# Patient Record
Sex: FEMALE | Race: OTHER
[De-identification: names, ages, dates, MRNs, and addresses within clinical notes are randomized per-mention and may not be internally consistent; named-entity substitution may affect disease eponyms.]

---

## 2017-09-26 ENCOUNTER — HOSPITAL ENCOUNTER (EMERGENCY)
Dept: HOSPITAL 54 - ER | Age: 72
Discharge: HOME | End: 2017-09-26
Payer: MEDICARE

## 2017-09-26 VITALS — BODY MASS INDEX: 32.39 KG/M2 | WEIGHT: 176 LBS | HEIGHT: 62 IN

## 2017-09-26 VITALS — DIASTOLIC BLOOD PRESSURE: 74 MMHG | SYSTOLIC BLOOD PRESSURE: 132 MMHG
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
## 2017-09-26 DIAGNOSIS — S82.851A: Primary | ICD-10-CM

## 2017-09-26 DIAGNOSIS — Z95.5: ICD-10-CM
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
## 2017-09-26 DIAGNOSIS — Z95.1: ICD-10-CM

## 2017-09-26 DIAGNOSIS — M85.80: ICD-10-CM

## 2017-09-26 PROCEDURE — A4606 OXYGEN PROBE USED W OXIMETER: HCPCS

## 2017-09-26 PROCEDURE — Z7610: HCPCS

## 2017-09-26 NOTE — NUR
PT CAME IN WITH C/O "TRIPPED OVER THE FLOOR LAST NIGHT AND FELL AT HOME"; 
DENIES PRIOR SX'S. SEEN BY MD FOR EVAL. VSS. SAFETY AND COMFORT MEASURES 
PROVIDED. WILL MONITOR.

## 2017-09-26 NOTE — NUR
EMILY ALEJANDRA AT  FOR CRUTCHES TRAINING AND SPLINT APPLICATION. TEACHINGS PROVIDED. 
ALL QUESTIONS ANSWERED.

## 2017-10-02 ENCOUNTER — HOSPITAL ENCOUNTER (OUTPATIENT)
Dept: HOSPITAL 54 - RAD | Age: 72
Discharge: HOME | End: 2017-10-02
Attending: PODIATRIST
Payer: MEDICARE

## 2017-10-02 DIAGNOSIS — S82.841A: Primary | ICD-10-CM
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
## 2017-10-02 DIAGNOSIS — M25.471: ICD-10-CM

## 2017-10-02 DIAGNOSIS — M77.31: ICD-10-CM
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
## 2017-10-03 ENCOUNTER — HOSPITAL ENCOUNTER (OUTPATIENT)
Dept: HOSPITAL 54 - WOU | Age: 72
Discharge: HOME HEALTH | End: 2017-10-03
Attending: PODIATRIST
Payer: MEDICARE

## 2017-10-03 DIAGNOSIS — Z79.02: ICD-10-CM

## 2017-10-03 DIAGNOSIS — Z74.01: ICD-10-CM
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
## 2017-10-03 DIAGNOSIS — R60.0: ICD-10-CM

## 2017-10-03 DIAGNOSIS — S82.841A: Primary | ICD-10-CM

## 2017-10-03 DIAGNOSIS — Z79.82: ICD-10-CM

## 2017-10-03 DIAGNOSIS — I51.9: ICD-10-CM

## 2017-10-03 DIAGNOSIS — Z95.5: ICD-10-CM
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
## 2017-10-31 ENCOUNTER — HOSPITAL ENCOUNTER (OUTPATIENT)
Dept: HOSPITAL 54 - WOU | Age: 72
Discharge: HOME HEALTH | End: 2017-10-31
Attending: PODIATRIST
Payer: MEDICARE

## 2017-10-31 DIAGNOSIS — M25.571: ICD-10-CM
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
## 2017-10-31 DIAGNOSIS — S82.841D: Primary | ICD-10-CM

## 2017-10-31 DIAGNOSIS — Z79.01: ICD-10-CM

## 2017-10-31 DIAGNOSIS — Z79.02: ICD-10-CM

## 2017-10-31 DIAGNOSIS — R60.0: ICD-10-CM

## 2017-11-28 ENCOUNTER — HOSPITAL ENCOUNTER (OUTPATIENT)
Dept: HOSPITAL 54 - RAD | Age: 72
Discharge: HOME | End: 2017-11-28
Attending: PODIATRIST
Payer: MEDICARE

## 2017-11-28 DIAGNOSIS — M25.471: Primary | ICD-10-CM

## 2017-12-01 ENCOUNTER — HOSPITAL ENCOUNTER (OUTPATIENT)
Dept: HOSPITAL 54 - WOU | Age: 72
Discharge: HOME HEALTH | End: 2017-12-01
Attending: PODIATRIST
Payer: MEDICARE

## 2017-12-01 DIAGNOSIS — R60.0: ICD-10-CM
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
## 2017-12-01 DIAGNOSIS — S82.841D: Primary | ICD-10-CM

## 2017-12-01 PROCEDURE — G0463 HOSPITAL OUTPT CLINIC VISIT: HCPCS

## 2017-12-28 ENCOUNTER — HOSPITAL ENCOUNTER (OUTPATIENT)
Dept: HOSPITAL 54 - RAD | Age: 72
Discharge: HOME | End: 2017-12-28
Attending: PODIATRIST
Payer: MEDICARE

## 2017-12-28 DIAGNOSIS — M77.31: ICD-10-CM

## 2017-12-28 DIAGNOSIS — M79.89: ICD-10-CM

## 2017-12-28 DIAGNOSIS — S82.51XD: Primary | ICD-10-CM
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
## 2017-12-28 DIAGNOSIS — M81.0: ICD-10-CM

## 2018-01-02 ENCOUNTER — HOSPITAL ENCOUNTER (OUTPATIENT)
Dept: HOSPITAL 54 - WOU | Age: 73
Discharge: HOME | End: 2018-01-02
Attending: PODIATRIST
Payer: MEDICARE

## 2018-01-02 DIAGNOSIS — M25.771: ICD-10-CM
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
## 2018-01-02 DIAGNOSIS — S82.841D: Primary | ICD-10-CM

## 2018-01-02 PROCEDURE — G0463 HOSPITAL OUTPT CLINIC VISIT: HCPCS

## 2018-01-29 ENCOUNTER — HOSPITAL ENCOUNTER (OUTPATIENT)
Dept: HOSPITAL 54 - RAD | Age: 73
Discharge: HOME | End: 2018-01-29
Attending: PODIATRIST
Payer: MEDICARE

## 2018-01-29 DIAGNOSIS — M81.8: ICD-10-CM
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
## 2018-01-29 DIAGNOSIS — S82.51XD: Primary | ICD-10-CM

## 2018-02-02 ENCOUNTER — HOSPITAL ENCOUNTER (OUTPATIENT)
Dept: HOSPITAL 54 - WOU | Age: 73
Discharge: HOME | End: 2018-02-02
Attending: PODIATRIST
Payer: MEDICARE

## 2018-02-02 DIAGNOSIS — S82.841D: Primary | ICD-10-CM

## 2018-02-02 DIAGNOSIS — R26.81: ICD-10-CM

## 2018-02-02 DIAGNOSIS — R60.0: ICD-10-CM
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
## 2018-02-02 PROCEDURE — G0463 HOSPITAL OUTPT CLINIC VISIT: HCPCS

## 2018-04-30 ENCOUNTER — HOSPITAL ENCOUNTER (OUTPATIENT)
Dept: HOSPITAL 54 - RAD | Age: 73
Discharge: HOME | End: 2018-04-30
Attending: PODIATRIST
Payer: MEDICARE

## 2018-04-30 DIAGNOSIS — M81.0: Primary | ICD-10-CM

## 2018-04-30 DIAGNOSIS — M77.31: ICD-10-CM

## 2018-04-30 DIAGNOSIS — M25.471: ICD-10-CM

## 2018-05-04 ENCOUNTER — HOSPITAL ENCOUNTER (OUTPATIENT)
Dept: HOSPITAL 54 - WOU | Age: 73
Discharge: HOME | End: 2018-05-04
Attending: PODIATRIST
Payer: MEDICARE
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
## 2018-05-04 DIAGNOSIS — R20.8: ICD-10-CM

## 2018-05-04 DIAGNOSIS — R53.1: ICD-10-CM

## 2018-05-04 DIAGNOSIS — S82.841D: Primary | ICD-10-CM

## 2018-05-04 PROCEDURE — G0463 HOSPITAL OUTPT CLINIC VISIT: HCPCS

## 2018-08-13 ENCOUNTER — HOSPITAL ENCOUNTER (OUTPATIENT)
Dept: HOSPITAL 54 - RAD | Age: 73
Discharge: HOME | End: 2018-08-13
Attending: PODIATRIST
Payer: MEDICARE

## 2018-08-13 DIAGNOSIS — M21.961: Primary | ICD-10-CM

## 2018-08-15 ENCOUNTER — HOSPITAL ENCOUNTER (OUTPATIENT)
Dept: HOSPITAL 54 - WOU | Age: 73
Discharge: HOME | End: 2018-08-15
Attending: PODIATRIST
Payer: MEDICARE

## 2018-08-15 DIAGNOSIS — S82.841S: ICD-10-CM

## 2018-08-15 DIAGNOSIS — S82.841D: Primary | ICD-10-CM
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
## 2018-08-15 DIAGNOSIS — M12.571: ICD-10-CM

## 2018-08-15 DIAGNOSIS — R60.0: ICD-10-CM

## 2018-08-15 PROCEDURE — G0463 HOSPITAL OUTPT CLINIC VISIT: HCPCS

## 2018-08-15 PROCEDURE — Z7610: HCPCS

## 2019-05-22 ENCOUNTER — HOSPITAL ENCOUNTER (INPATIENT)
Dept: HOSPITAL 10 - REC | Age: 74
LOS: 2 days | Discharge: HOME HEALTH | DRG: 470 | End: 2019-05-24
Attending: ORTHOPAEDIC SURGERY | Admitting: ORTHOPAEDIC SURGERY
Payer: MEDICARE

## 2019-05-22 VITALS
BODY MASS INDEX: 30.56 KG/M2 | WEIGHT: 179.02 LBS | HEIGHT: 64 IN | HEIGHT: 64 IN | WEIGHT: 179.02 LBS | BODY MASS INDEX: 30.56 KG/M2

## 2019-05-22 VITALS — DIASTOLIC BLOOD PRESSURE: 63 MMHG | SYSTOLIC BLOOD PRESSURE: 122 MMHG | HEART RATE: 65 BPM | RESPIRATION RATE: 16 BRPM

## 2019-05-22 VITALS — HEART RATE: 91 BPM | SYSTOLIC BLOOD PRESSURE: 112 MMHG | DIASTOLIC BLOOD PRESSURE: 59 MMHG | RESPIRATION RATE: 18 BRPM

## 2019-05-22 VITALS — DIASTOLIC BLOOD PRESSURE: 59 MMHG | RESPIRATION RATE: 12 BRPM | SYSTOLIC BLOOD PRESSURE: 113 MMHG | HEART RATE: 94 BPM

## 2019-05-22 VITALS — HEART RATE: 96 BPM | DIASTOLIC BLOOD PRESSURE: 56 MMHG | RESPIRATION RATE: 15 BRPM | SYSTOLIC BLOOD PRESSURE: 112 MMHG

## 2019-05-22 VITALS — HEART RATE: 91 BPM | RESPIRATION RATE: 18 BRPM | SYSTOLIC BLOOD PRESSURE: 114 MMHG | DIASTOLIC BLOOD PRESSURE: 59 MMHG

## 2019-05-22 VITALS — DIASTOLIC BLOOD PRESSURE: 59 MMHG | SYSTOLIC BLOOD PRESSURE: 104 MMHG | RESPIRATION RATE: 11 BRPM | HEART RATE: 98 BPM

## 2019-05-22 VITALS — DIASTOLIC BLOOD PRESSURE: 51 MMHG | HEART RATE: 92 BPM | SYSTOLIC BLOOD PRESSURE: 107 MMHG | RESPIRATION RATE: 19 BRPM

## 2019-05-22 VITALS — DIASTOLIC BLOOD PRESSURE: 58 MMHG | HEART RATE: 98 BPM | SYSTOLIC BLOOD PRESSURE: 104 MMHG | RESPIRATION RATE: 16 BRPM

## 2019-05-22 VITALS — RESPIRATION RATE: 13 BRPM | HEART RATE: 98 BPM | DIASTOLIC BLOOD PRESSURE: 58 MMHG | SYSTOLIC BLOOD PRESSURE: 112 MMHG

## 2019-05-22 VITALS — HEART RATE: 96 BPM | RESPIRATION RATE: 13 BRPM | DIASTOLIC BLOOD PRESSURE: 56 MMHG | SYSTOLIC BLOOD PRESSURE: 107 MMHG

## 2019-05-22 VITALS — HEART RATE: 98 BPM | SYSTOLIC BLOOD PRESSURE: 112 MMHG | RESPIRATION RATE: 14 BRPM | DIASTOLIC BLOOD PRESSURE: 58 MMHG

## 2019-05-22 VITALS — RESPIRATION RATE: 18 BRPM | HEART RATE: 95 BPM | SYSTOLIC BLOOD PRESSURE: 101 MMHG | DIASTOLIC BLOOD PRESSURE: 56 MMHG

## 2019-05-22 VITALS — RESPIRATION RATE: 16 BRPM | HEART RATE: 98 BPM | SYSTOLIC BLOOD PRESSURE: 113 MMHG | DIASTOLIC BLOOD PRESSURE: 59 MMHG

## 2019-05-22 VITALS — RESPIRATION RATE: 16 BRPM | HEART RATE: 100 BPM | DIASTOLIC BLOOD PRESSURE: 56 MMHG | SYSTOLIC BLOOD PRESSURE: 107 MMHG

## 2019-05-22 VITALS — SYSTOLIC BLOOD PRESSURE: 102 MMHG | HEART RATE: 94 BPM | DIASTOLIC BLOOD PRESSURE: 54 MMHG | RESPIRATION RATE: 18 BRPM

## 2019-05-22 VITALS — DIASTOLIC BLOOD PRESSURE: 59 MMHG | SYSTOLIC BLOOD PRESSURE: 107 MMHG | HEART RATE: 94 BPM | RESPIRATION RATE: 13 BRPM

## 2019-05-22 VITALS — RESPIRATION RATE: 18 BRPM | HEART RATE: 94 BPM | DIASTOLIC BLOOD PRESSURE: 56 MMHG | SYSTOLIC BLOOD PRESSURE: 101 MMHG

## 2019-05-22 VITALS — HEART RATE: 94 BPM | SYSTOLIC BLOOD PRESSURE: 125 MMHG | DIASTOLIC BLOOD PRESSURE: 59 MMHG | RESPIRATION RATE: 18 BRPM

## 2019-05-22 VITALS — SYSTOLIC BLOOD PRESSURE: 113 MMHG | HEART RATE: 96 BPM | DIASTOLIC BLOOD PRESSURE: 56 MMHG | RESPIRATION RATE: 13 BRPM

## 2019-05-22 VITALS — DIASTOLIC BLOOD PRESSURE: 56 MMHG | SYSTOLIC BLOOD PRESSURE: 107 MMHG | HEART RATE: 94 BPM | RESPIRATION RATE: 13 BRPM

## 2019-05-22 VITALS — SYSTOLIC BLOOD PRESSURE: 109 MMHG | HEART RATE: 92 BPM | RESPIRATION RATE: 14 BRPM | DIASTOLIC BLOOD PRESSURE: 55 MMHG

## 2019-05-22 VITALS — DIASTOLIC BLOOD PRESSURE: 56 MMHG | RESPIRATION RATE: 28 BRPM | SYSTOLIC BLOOD PRESSURE: 112 MMHG | HEART RATE: 94 BPM

## 2019-05-22 VITALS — SYSTOLIC BLOOD PRESSURE: 106 MMHG | HEART RATE: 92 BPM | DIASTOLIC BLOOD PRESSURE: 55 MMHG | RESPIRATION RATE: 14 BRPM

## 2019-05-22 VITALS — RESPIRATION RATE: 18 BRPM | HEART RATE: 92 BPM | SYSTOLIC BLOOD PRESSURE: 107 MMHG | DIASTOLIC BLOOD PRESSURE: 56 MMHG

## 2019-05-22 VITALS — RESPIRATION RATE: 22 BRPM | DIASTOLIC BLOOD PRESSURE: 57 MMHG | SYSTOLIC BLOOD PRESSURE: 112 MMHG | HEART RATE: 100 BPM

## 2019-05-22 VITALS — HEART RATE: 90 BPM | RESPIRATION RATE: 18 BRPM | SYSTOLIC BLOOD PRESSURE: 103 MMHG | DIASTOLIC BLOOD PRESSURE: 51 MMHG

## 2019-05-22 DIAGNOSIS — Z95.1: ICD-10-CM

## 2019-05-22 DIAGNOSIS — Z79.82: ICD-10-CM

## 2019-05-22 DIAGNOSIS — I12.9: ICD-10-CM

## 2019-05-22 DIAGNOSIS — N18.9: ICD-10-CM

## 2019-05-22 DIAGNOSIS — M17.11: Primary | ICD-10-CM

## 2019-05-22 DIAGNOSIS — E78.5: ICD-10-CM

## 2019-05-22 DIAGNOSIS — D50.9: ICD-10-CM

## 2019-05-22 DIAGNOSIS — E66.9: ICD-10-CM

## 2019-05-22 DIAGNOSIS — I25.10: ICD-10-CM

## 2019-05-22 DIAGNOSIS — E87.5: ICD-10-CM

## 2019-05-22 PROCEDURE — 97110 THERAPEUTIC EXERCISES: CPT

## 2019-05-22 PROCEDURE — 88311 DECALCIFY TISSUE: CPT

## 2019-05-22 PROCEDURE — 0SRC069 REPLACEMENT OF RIGHT KNEE JOINT WITH OXIDIZED ZIRCONIUM ON POLYETHYLENE SYNTHETIC SUBSTITUTE, CEMENTED, OPEN APPROACH: ICD-10-PCS | Performed by: ORTHOPAEDIC SURGERY

## 2019-05-22 PROCEDURE — 73560 X-RAY EXAM OF KNEE 1 OR 2: CPT

## 2019-05-22 PROCEDURE — 88304 TISSUE EXAM BY PATHOLOGIST: CPT

## 2019-05-22 PROCEDURE — 97116 GAIT TRAINING THERAPY: CPT

## 2019-05-22 PROCEDURE — 97161 PT EVAL LOW COMPLEX 20 MIN: CPT

## 2019-05-22 PROCEDURE — 83735 ASSAY OF MAGNESIUM: CPT

## 2019-05-22 PROCEDURE — C1713 ANCHOR/SCREW BN/BN,TIS/BN: HCPCS

## 2019-05-22 PROCEDURE — 71045 X-RAY EXAM CHEST 1 VIEW: CPT

## 2019-05-22 PROCEDURE — C1776 JOINT DEVICE (IMPLANTABLE): HCPCS

## 2019-05-22 PROCEDURE — 80048 BASIC METABOLIC PNL TOTAL CA: CPT

## 2019-05-22 PROCEDURE — 85025 COMPLETE CBC W/AUTO DIFF WBC: CPT

## 2019-05-22 PROCEDURE — 76775 US EXAM ABDO BACK WALL LIM: CPT

## 2019-05-22 RX ADMIN — TRANEXAMIC ACID ONE: 10 INJECTION, SOLUTION INTRAVENOUS at 11:46

## 2019-05-22 RX ADMIN — GABAPENTIN SCH MG: 100 CAPSULE ORAL at 21:11

## 2019-05-22 RX ADMIN — GABAPENTIN SCH MG: 100 CAPSULE ORAL at 13:00

## 2019-05-22 RX ADMIN — ATORVASTATIN CALCIUM SCH MG: 20 TABLET, FILM COATED ORAL at 21:41

## 2019-05-22 RX ADMIN — DEXAMETHASONE SODIUM PHOSPHATE SCH MG: 10 INJECTION, SOLUTION INTRAMUSCULAR; INTRAVENOUS at 14:07

## 2019-05-22 RX ADMIN — DEXAMETHASONE SODIUM PHOSPHATE SCH MG: 10 INJECTION, SOLUTION INTRAMUSCULAR; INTRAVENOUS at 19:54

## 2019-05-22 RX ADMIN — TRANEXAMIC ACID ONE: 10 INJECTION, SOLUTION INTRAVENOUS at 11:47

## 2019-05-22 RX ADMIN — LOSARTAN POTASSIUM SCH MG: 25 TABLET, FILM COATED ORAL at 21:11

## 2019-05-22 NOTE — HPN
Date/Time of Note


Date/Time of Note


DATE: 5/22/19 


TIME: 10:53





Interval H&P Admission Note


Pt. seen H&P reviewed:  No system changes











CHILO LOPEZ MD         May 22, 2019 10:53

## 2019-05-22 NOTE — PREAC
Date/Time of Note


Date/Time of Note


DATE: 5/22/19 


TIME: 11:00





Anesthesia Eval and Record


Evaluation


Time Pre-Procedure Interview


DATE: 5/22/19 


TIME: 11:00


Age


74


Sex


female


NPO:  8 hrs


Preoperative diagnosis


Right kneee primary OA


Planned procedure


right knee replacement





Past Medical History


Past Medical History:  Includes


Cardio:  HTN, Dyslipidemia, CABG, PTCA/Stent





Surgery & Anesthesia Issues


No known issue





Meds


Anticoagulation:  Yes (stop 15 plavix)


Beta Blocker within 24 hr:  Yes


Reason Beta Blocker not given:  Pt. not on B-Blocker


Reported Medications


Aspirin (Low Dose Aspirin) 81 Mg Tablet.dr, 81 MG PO DAILY, #30 TAB


   5/22/19


Atorvastatin Calcium* (Atorvastatin Calcium*) 20 Mg Tablet, 20 MG PO QHS, #30 


TAB


   5/22/19


Clopidogrel Bisulfate (Clopidogrel) 75 Mg Tablet, 75 MG PO DAILY, #30 TAB


   5/22/19


Losartan Potassium* (Losartan Potassium*) 25 Mg Tablet, 25 MG PO BID, TAB


   5/22/19





Current Medications


Lactated Ringer's 1,000 ml @  125 mls/hr Q8H IV  Last administered on 5/22/19at 


09:13; Admin Dose 125 MLS/HR;  Start 5/22/19 at 07:00;  Stop 5/22/19 at 14:59


Ropivacaine/ Morphine Sulfate/ Clonidine HCl/ Epinephrine/ Ketorolac 


Tromethamine/ Vancomycin HCl/ Sodium Chloride  INTRA-OP INJ ;  Start 5/22/19 at 


07:00;  Stop 5/22/19 at 16:00


Meds reviewed:  Yes





Allergies


Coded Allergies:  


     No Known Allergy (Unverified , 5/22/19)


Allergies Reviewed:  Yes





Labs/Studies


Labs Reviewed:  Reviewed by anesthesiologist


Pregnancy test:  N/A


Studies:  ECG (sr), CXR (nl)





Pre-procedure Exam


Last vitals





Vital Signs


  Date      Temp  Pulse  Resp  B/P (MAP)   Pulse Ox  O2          O2 Flow    FiO2


Time                                                 Delivery    Rate


   5/22/19  97.1     65    16      122/63        99  Room Air


     09:44                           (82)





Airway:  Adequate mouth opening


Mallampati:  Mallampati I


Teeth:  Normal


Lung:  Normal


Heart:  Normal





ASA Physical Status


ASA physical status:  2


Emergency:  None





Planned Anesthetic


General/MAC:  LMA


Neuraxial:  Spinal


Nerve block:  Femoral (right)





Planned Pain Management


Single shot nerve block, Parenteral pain med





Pre-operative Attestations


Prior to commencing anesthesia and surgery, the patient was re-evaluated, there 


was verification of:


*The patient's identity


*The results of appropriate recent lab work and preoperative vital signs


*The above evaluation not changing prior to induction


*Anesthetic plan, risk benefits, alternative and complications discussed with 


patient/family; questions answered; patient/family understands, accepts and 


wishes to proceed.











KALINA BOURGEOIS MD            May 22, 2019 11:02

## 2019-05-22 NOTE — CONS
Assessment/Plan


Assessment/Plan


Hospital Course (Demo Recall)


75 yo F admitted for elective RTKA for severe R OA being admitted postop for 


whom we are consulted for management of medical conditions: 





1. HTN


2. Dyslipidemia 


3. CAD s.p CABG 





plan: 


recommend low cholesterol, low fat diet


resume home meds and titrate as indicated 


serial labs 


continue routine post op per otrtho


further recommendations per course 





Thanks for the Consult. We will follow with you.





Consultation Date/Type/Reason


Admit Date/Time


May 22, 2019 at 07:23


Date/Time of Note


DATE: 5/22/19 


TIME: 16:36





Hx of Present Illness





71-year-old female who was admitted for elective right total knee arthroplasty 


due to end-stage osteoarthritis of the right knee that had failed nonoperative 


management.  She is status post surgery, and is being admitted to the medical 


surgical floor for postoperative care and interventions.  We are being consulted


for management of her medical conditions.  Medical problems include hypertensio


n, dyslipidemia, coronary artery disease status post CABG in the past.  At this 


time patient has no new complaints or concerns, she is lethargic from surgery.


.


12 point review if systems was done and pertinent findings are as noted.





Past Medical History


Medical History:  coronary artery disease, diabetes, high cholesterol, 


hypertension


Home Meds


Reported Medications


Aspirin (Low Dose Aspirin) 81 Mg Tablet.dr, 81 MG PO DAILY, #30 TAB


   5/22/19


Atorvastatin Calcium* (Atorvastatin Calcium*) 20 Mg Tablet, 20 MG PO QHS, #30 


TAB


   5/22/19


Clopidogrel Bisulfate (Clopidogrel) 75 Mg Tablet, 75 MG PO DAILY, #30 TAB


   5/22/19


Losartan Potassium* (Losartan Potassium*) 25 Mg Tablet, 25 MG PO BID, TAB


   5/22/19


Medications





Current Medications


Hydromorphone HCl (Dilaudid) 0.2 mg PACU PRN IV MILD PAIN 1-3;  Start 5/22/19 at


11:30;  Stop 5/22/19 at 17:00


Hydromorphone HCl (Dilaudid) 0.4 mg PACU PRN IV MOD PAIN 4-6;  Start 5/22/19 at 


11:30;  Stop 5/22/19 at 17:00


Hydromorphone HCl (Dilaudid) 0.6 mg PACU PRN IV SEVERE PAIN 7-10;  Start 5/22/19


at 11:30;  Stop 5/22/19 at 17:00


Ondansetron HCl (Zofran Inj) 4 mg PACU ORDER PRN IV NAUSEA/VOMITING;  Start 


5/22/19 at 11:30;  Stop 5/22/19 at 17:00


Meperidine HCl (Demerol) 25 mg PACU ORDER PRN IV .RIGORS;  Start 5/22/19 at 


11:30;  Stop 5/22/19 at 17:00


Diphenhydramine HCl (Benadryl) 25 mg PACU ORDER PRN IV .PRURITUS;  Start 5/22/19


at 11:30;  Stop 5/22/19 at 17:00


IV Flush (NS 3 ml) 3 ml PER PROTOCOL IV ;  Start 5/22/19 at 13:00


Oxycodone HCl (Roxicodone) 5 mg Q4H  PRN PO .PAIN;  Start 5/22/19 at 13:00


Ketorolac Tromethamine (Toradol) 15 mg Q6H  PRN IV .PAIN;  Start 5/22/19 at 


13:00


Ondansetron HCl (Zofran Inj) 4 mg Q6H IV  Last administered on 5/22/19at 14:07; 


Admin Dose 4 MG;  Start 5/22/19 at 13:00;  Stop 5/23/19 at 07:01


Cefazolin Sodium/ Dextrose 50 ml @  100 mls/hr Q8H IVPB  Last administered on 


5/22/19at 13:52; Admin Dose 100 MLS/HR;  Start 5/22/19 at 13:00;  Stop 5/23/19 


at 05:29


Celecoxib (Celebrex) 100 mg BID PO ;  Start 5/23/19 at 09:00


Gabapentin (Neurontin) 100 mg TID PO ;  Start 5/22/19 at 13:00


Pantoprazole (Protonix Tab) 40 mg DAILY@06 PO ;  Start 5/24/19 at 06:00


Naloxone HCl (Narcan) 0.2 mg Q2M PRN IV .RESP RATE;  Start 5/22/19 at 13:00


Aspirin (Halfprin) 81 mg BID PO ;  Start 5/23/19 at 09:00


Allergies:  


Coded Allergies:  


     No Known Allergy (Unverified , 5/22/19)





Past Surgical History


Right total knee arthroplasty today


Past Surgical Hx:  coronary bypass surgery





Family History


Significant Family History:  no pertinent family hx





Social History


Alcohol Use:  none


Smoking Status:  Never smoker


Drug Use:  none





Exam/Review of Systems


Exam


Vitals





Vital Signs


  Date      Temp  Pulse  Resp  B/P (MAP)   Pulse Ox  O2          O2 Flow    FiO2


Time                                                 Delivery    Rate


   5/22/19           92    14      106/55        97  Nasal             3.0


     14:19                           (72)            Cannula


   5/22/19  98.0


     13:37





Constitutional:  other


Head:  normocephalic


Eyes:  PERRL


Respiratory:  clear to auscultation, diminished breath sounds


Cardiovascular:  regular rate and rhythm; 


   No murmurs/extra sounds


Gastrointestinal:  soft, non-tender, bowel sounds


Musculoskeletal:  other (Right lower extremity encased in splint)


Neurological:  lethargic





Imaging


Imaging


POSTOPERATIVE DIAGNOSIS:   Right knee osteoarthritis.


 


PROCEDURES PERFORMED:   Right total knee arthroplasty, CPT code 85845.


 


ANESTHESIOLOGIST:  Dr. Iniguez


 


ANESTHESIA:  Spinal.


 


ESTIMATED BLOOD LOSS:   250 mL.


 


COMPLICATIONS:  None.





_________________________________________________________________________________


___________________________________________


PROCEDURE:   XR Chest. 


 


CLINICAL INDICATION:   Preoperative examination


 


TECHNIQUE:   Single frontal radiograph of the chest.


 


COMPARISON:   No prior


 


FINDINGS:


 


Postoperative changes from open thoracic surgery with sternotomy wires.


The lungs are clear. 


No pleural effusion.


No pneumothorax. 


Heart size is magnified.


Vascular calcifications of the aorta are present compatible with 


atherosclerosis.


 


IMPRESSION:


No acute air space infiltrates.


 


 


RPTAT: AADD


_____________________________________________ 


.Ashok Hardy MD, MD           Date    Time 


Electronically viewed and signed by .Ashok Hardy MD, MD on 05/22/2019 


09:17 


 


D:  05/22/2019 09:17  T:  05/22/2019 09:17


.B/





CC: CHILO LOPEZ MD





[phone]





Medications


Medication





Current Medications


Hydromorphone HCl (Dilaudid) 0.2 mg PACU PRN IV MILD PAIN 1-3;  Start 5/22/19 at


11:30;  Stop 5/22/19 at 17:00


Hydromorphone HCl (Dilaudid) 0.4 mg PACU PRN IV MOD PAIN 4-6;  Start 5/22/19 at 


11:30;  Stop 5/22/19 at 17:00


Hydromorphone HCl (Dilaudid) 0.6 mg PACU PRN IV SEVERE PAIN 7-10;  Start 5/22/19


at 11:30;  Stop 5/22/19 at 17:00


Ondansetron HCl (Zofran Inj) 4 mg PACU ORDER PRN IV NAUSEA/VOMITING;  Start 


5/22/19 at 11:30;  Stop 5/22/19 at 17:00


Meperidine HCl (Demerol) 25 mg PACU ORDER PRN IV .RIGORS;  Start 5/22/19 at 


11:30;  Stop 5/22/19 at 17:00


Diphenhydramine HCl (Benadryl) 25 mg PACU ORDER PRN IV .PRURITUS;  Start 5/22/19


at 11:30;  Stop 5/22/19 at 17:00


IV Flush (NS 3 ml) 3 ml PER PROTOCOL IV ;  Start 5/22/19 at 13:00


Oxycodone HCl (Roxicodone) 5 mg Q4H  PRN PO .PAIN;  Start 5/22/19 at 13:00


Ketorolac Tromethamine (Toradol) 15 mg Q6H  PRN IV .PAIN;  Start 5/22/19 at 


13:00


Ondansetron HCl (Zofran Inj) 4 mg Q6H IV  Last administered on 5/22/19at 14:07; 


Admin Dose 4 MG;  Start 5/22/19 at 13:00;  Stop 5/23/19 at 07:01


Cefazolin Sodium/ Dextrose 50 ml @  100 mls/hr Q8H IVPB  Last administered on 


5/22/19at 13:52; Admin Dose 100 MLS/HR;  Start 5/22/19 at 13:00;  Stop 5/23/19 


at 05:29


Celecoxib (Celebrex) 100 mg BID PO ;  Start 5/23/19 at 09:00


Gabapentin (Neurontin) 100 mg TID PO ;  Start 5/22/19 at 13:00


Pantoprazole (Protonix Tab) 40 mg DAILY@06 PO ;  Start 5/24/19 at 06:00


Naloxone HCl (Narcan) 0.2 mg Q2M PRN IV .RESP RATE;  Start 5/22/19 at 13:00


Aspirin (Halfprin) 81 mg BID PO ;  Start 5/23/19 at 09:00











KENZIE GAMBOA                May 22, 2019 16:44

## 2019-05-22 NOTE — SIPON
Date/Time of Note


Date/Time of Note


DATE: 5/22/19 


TIME: 12:43





Operative Report


Preoperative Diagnosis


Right Knee Osteoarthritis


Postoperative Diagnosis


Same


Operation/Procedure Performed


Right Total Knee Arthroplasty


Surgeon


Shayne Lopez MD


First assist


Dewey Partida


Second assist:  BETTE KRISHNAMURTHY


Anesthesia:  spinal


Estimated blood loss:  250 - 300 ml's


Transfusion Required


   none


Specimen


bone


Grafts/Implants


none


Complications


none











SHAYNE LOPEZ MD         May 22, 2019 12:44

## 2019-05-22 NOTE — OPR
Date/Time of Note


Date/Time of Note


DATE: 5/22/19 


TIME: 12:48





Operative Report


Free Text/Dictation


DATE OF OPERATION: May 22, 2019


 


 


SURGEON:  Chilo Lopez MD


 


ASSISTANT:  Dewey PACHECO ASSISTANT: ERICA Buckner


 


PREOPERATIVE DIAGNOSIS: Right knee osteoarthritis.


 


POSTOPERATIVE DIAGNOSIS:   Right knee osteoarthritis.


 


PROCEDURES PERFORMED:   Right total knee arthroplasty, CPT code 12781.


 


ANESTHESIOLOGIST:  Dr. Iniguez


 


ANESTHESIA:  Spinal.


 


ESTIMATED BLOOD LOSS:   250 mL.


 


COMPLICATIONS:  None.


 


SPECIMENS:  Resected bone.


 


DISPOSITION:  PACU in stable condition.


 


TOURNIQUET TIME:   33 minutes at 250 mmHg.


 


IMPLANT USED:  [employer] size 3 Kelly tibial baseplate, size 5 narrow 


posterior stabilized Oxinium femur, size 9 mm high flexion polyethylene, size 35


mm patella.


 


INDICATION FOR PROCEDURE:  This is an 74-year-old female with end-stage 


osteoarthritis of the right knee who had failed nonoperative management.  Risks,


benefits, alternatives of surgical intervention were discussed with the patient 


and informed consent was obtained.


 


The risks of surgery include but are not limited to infection, deep venous 


thrombosis, pulmonary embolism, damage to nerves and blood vessels, numbness 


around incision site, stiffness of knee, need for total knee manipulation under 


anesthesia, need for blood transfuion, heart attack, stroke, risks associated 


with anesthesia, implant loosening, wear of prosthesis, need for revision 


surgery, and death.


 


DESCRIPTION OF PROCEDURE:  The patient was met in the preoperative suite.  The 


correct operative site was confirmed and marked.  The patient was then brought 


into operating room.  After induction of anesthesia, the patient was placed in 


the supine position on the operating room table.  A tourniquet was applied to 


right upper thigh.  The right lower extremity was prepped and draped in the 


usual sterile fashion.  Before starting, a timeout was taken to identify the 


correct operative site and confirm preoperative antibiotics consisting of 1 g of


IV Ancef, along with 1 g of tranexamic acid were administered.  At this point, 


the right leg was elevated and exsanguinated with an Esmarch and tourniquet was 


then insufflated for the above noted time.  A midline incision was made and 


median parapatellar arthrotomy was then completed.  The lateral patellar r


etinacular ligaments were released.  A sleeve of tissue was released from the 


medial proximal tibia.  The cruciate ligaments and the menisci were then 


excised.  At this point, the custom distal femur cutting block was then pinned 


and 9.5 mm was resected from the distal femur.  The 4-in-1 cutting block, size 5


was then placed. An rylie wing was used to confirm that notching of the anterior


cortex of the femur would not occur.  The anterior and posterior condylar cuts 


were completed followed by the anterior and posterior chamfer cuts.  The 


osteophytes were then removed with a rongeur.


 


At this point, the tibia was subluxed anteriorly.  Appropriate retractors were 


placed.  The custom tibial cutting block was then pinned. The drop was used to 


ensure the correct alignment. Approximately 11 mm was resected off the lateral 


tibial plateau and 6 mm off the medial tibial plateau.  Osteophytes were then 


removed.  At this point, the flexion extension gaps were checked with a 9 mm gap


 and noted to be equal.  Next, trial 5 narrow femur was then pinned and 


the box cut was then completed.  The tibia was then subluxed anteriorly and 


measured to size 3.  The tibial tray was then pinned and a keel was then 


punched.  The trial components were placed with a 9 mm polyethylene and noted to


have full extension and greater than 120 degrees of flexion.  The patella was 


then subluxed laterally and sized to 22 mm.   Approximately, 8 mm was resected. 


The patellar was sized to 35 mm.  The button was placed and noted to have excell


ent patellar tracking.  The trial components were removed.  All bony surfaces 


were pulse lavaged and dried.  The appropriate size components were then 


cemented and the knee was held in extension with a 9 mm trial polyethylene until


the cement cured.  Once the cement had cured, the trial polyethylene was removed


and the appropriate size polyethylene was then placed.  The tranexamic acid was 


redosed.  The cocktail was then injected.  The extensor mechanism was closed 


using #1 Stratafix and the subcutaneous tissue with 2-0 Vicryl and the skin with


4-0 Monocryl.  Steri-Strips were applied along with a sterile dressing.  There 


were no complications.  The patient was transferred to PACU in stable condition.


 


POSTOPERATIVE CARE: The patient  will be weightbearing as tolerated.  The 


patient will work with physical therapy, and will receive two additional doses 


of IV antibiotics along with aspirin 81 mg p.o. b.i.d. for 6 weeks.  Upon 


discharge, patient will follow up in my office within 2 weeks postoperatively.











CHILO LOPEZ MD         May 22, 2019 12:50

## 2019-05-23 VITALS — SYSTOLIC BLOOD PRESSURE: 118 MMHG | HEART RATE: 78 BPM | DIASTOLIC BLOOD PRESSURE: 65 MMHG | RESPIRATION RATE: 20 BRPM

## 2019-05-23 VITALS — DIASTOLIC BLOOD PRESSURE: 58 MMHG | SYSTOLIC BLOOD PRESSURE: 107 MMHG | HEART RATE: 101 BPM | RESPIRATION RATE: 18 BRPM

## 2019-05-23 VITALS — SYSTOLIC BLOOD PRESSURE: 111 MMHG | HEART RATE: 93 BPM | RESPIRATION RATE: 18 BRPM | DIASTOLIC BLOOD PRESSURE: 54 MMHG

## 2019-05-23 VITALS — RESPIRATION RATE: 18 BRPM | HEART RATE: 90 BPM | SYSTOLIC BLOOD PRESSURE: 105 MMHG | DIASTOLIC BLOOD PRESSURE: 57 MMHG

## 2019-05-23 RX ADMIN — GABAPENTIN SCH MG: 100 CAPSULE ORAL at 13:21

## 2019-05-23 RX ADMIN — DEXAMETHASONE SODIUM PHOSPHATE SCH MG: 10 INJECTION, SOLUTION INTRAMUSCULAR; INTRAVENOUS at 06:03

## 2019-05-23 RX ADMIN — HEPARIN SODIUM SCH UNIT: 5000 INJECTION, SOLUTION INTRAVENOUS; SUBCUTANEOUS at 20:25

## 2019-05-23 RX ADMIN — LOSARTAN POTASSIUM SCH MG: 25 TABLET, FILM COATED ORAL at 08:54

## 2019-05-23 RX ADMIN — DEXAMETHASONE SODIUM PHOSPHATE SCH MG: 10 INJECTION, SOLUTION INTRAMUSCULAR; INTRAVENOUS at 01:16

## 2019-05-23 RX ADMIN — METOPROLOL TARTRATE SCH MG: 25 TABLET, FILM COATED ORAL at 20:23

## 2019-05-23 RX ADMIN — GABAPENTIN SCH MG: 100 CAPSULE ORAL at 20:23

## 2019-05-23 RX ADMIN — GABAPENTIN SCH MG: 100 CAPSULE ORAL at 08:54

## 2019-05-23 RX ADMIN — ATORVASTATIN CALCIUM SCH MG: 20 TABLET, FILM COATED ORAL at 20:23

## 2019-05-23 NOTE — OPPN
Date/Time of Note


Date/Time of Note


DATE: 5/23/19 


TIME: 17:28





Anesthesia Follow up


Anesthesia Follow up


Last documented vital signs





Vital Signs


  Date      Temp  Pulse  Resp  B/P (MAP)   Pulse Ox  O2          O2 Flow    FiO2


Time                                                 Delivery    Rate


   5/23/19  98.0     93    18      111/54       100  Room Air


     07:42                           (73)


   5/22/19                                                             2.0


     17:20





Respiratory function:  WNL


Cardiovascular function:  WNL


Comments


A 74 female s/p GA, spinal with duramorph for post op apin per surgeon POD#1 is 


doiung fine. No pain, itching, N/V, headache, neural deficit. care per surgery











KALINA BOURGEOIS MD            May 23, 2019 17:29

## 2019-05-23 NOTE — CONS
Assessment/Plan


Assessment/Plan


Hospital Course (Demo Recall)


73 yo F admitted for elective RTKA for severe R OA being admitted postop for wh


om we are consulted for management of medical conditions: 





1. HTN: improved control


2. Dyslipidemia 


3. CAD s.p CABG 


4. Hypochromic anemia: No indication for transfusion, likely chronic


5. Chronic kidney disease, GI on likely


6. Mild hyperkalemia: May be medication induced, patient on losartan.





plan: 


Will order iron profile to rule out iron deficiency concern hypochromic anemia


Repeat potassium levels now and intervene as indicated


Recommend avoiding NSAIDs and other nephrotoxic medication in view of CKD


We will also get renal ultrasound to rule out obstructive courses.  We will also


hold home ACE inhibitor for now and use beta-blocker therapy for blood pressure 


control due to renal function.  


Recommend use of heparin instead of twice daily aspirin again in view of renal 


insufficiency for DVT prophylaxis. 





Thanks for the Consult. We will follow with you.





Consultation Date/Type/Reason


Admit Date/Time


May 22, 2019 at 07:23


Initial Consult Date





Date/Time of Note


DATE: 5/23/19 


TIME: 12:37





24 HR Interval Summary


Free Text/Dictation


No new complaints





Exam/Review of Systems


Exam


Vitals





Vital Signs


  Date      Temp  Pulse  Resp  B/P (MAP)   Pulse Ox  O2          O2 Flow    FiO2


Time                                                 Delivery    Rate


   5/23/19  98.0     93    18      111/54       100  Room Air


     07:42                           (73)


   5/22/19                                                             2.0


     17:20








Intake and Output





5/22/19 5/22/19 5/23/19





1414:59


22:59


06:59





IntakeIntake Total


1450 ml


1050 ml


50 ml





OutputOutput Total


50 ml





BalanceBalance


1400 ml


1050 ml


50 ml











Exam


Constitutional:  alert, oriented


Head:  atraumatic, normocephalic


Neck:  non-tender, supple


Respiratory:  clear to auscultation


Cardiovascular:  regular rate and rhythm


Gastrointestinal:  S/ NT / ND / +BS


Extremities:  no edema, good radial pulses





Results


Result Diagram:  


5/23/19 0419                                                                    


           5/23/19 0419





Results 24hrs





Laboratory Tests


Test
                              5/22/19
17:02  5/23/19
04:19    5/23/19
06:53


Sodium Level                               140            142


Potassium Level                            5.1           5.3  H


Chloride Level                             108            107


Carbon Dioxide Level                        23             27


Anion Gap                                    9              8


Blood Urea Nitrogen                        23  H          30  H


Creatinine                               1.03  H        1.03  H


Est Glomerular Filtrat               
              
            



Rate
mL/min


Glucose Level                              175           134  #


Calcium Level                              8.7            8.9


White Blood Count                                        10.4


Red Blood Count                                          4.46


Hemoglobin                                               9.2  L


Hematocrit                                              30.3  L


Mean Corpuscular Volume                                 67.9  L


Mean Corpuscular Hemoglobin                             20.6  L


Mean Corpuscular                   
                   30.4  L
  



Hemoglobin
Concent


Red Cell Distribution Width                             14.8  H


Platelet Count                                            229


Mean Platelet Volume                                    11.2  H


Immature Granulocytes %                                 0.400


Neutrophils %                                           86.3  H


Lymphocytes %                                            7.3  L


Monocytes %                                               5.9


Eosinophils %                                             0.0


Basophils %                                               0.1


Nucleated Red Blood Cells %                               0.0


Immature Granulocytes #                                0.040  H


Neutrophils #                                            8.9  H


Lymphocytes #                                             0.8


Monocytes #                                               0.6


Eosinophils #                                             0.0


Basophils #                                               0.0


Nucleated Red Blood Cells #                               0.0


Magnesium Level                                           2.0


Lab Scanned Report                                               REFERENCE LAB








Medications


Medication





Current Medications


IV Flush (NS 3 ml) 3 ml PER PROTOCOL IV ;  Start 5/22/19 at 13:00


Oxycodone HCl (Roxicodone) 5 mg Q4H  PRN PO .PAIN Last administered on 5/23/19at


08:54; Admin Dose 5 MG;  Start 5/22/19 at 13:00


Ketorolac Tromethamine (Toradol) 15 mg Q6H  PRN IV .PAIN Last administered on 


5/23/19at 10:56; Admin Dose 15 MG;  Start 5/22/19 at 13:00


Celecoxib (Celebrex) 100 mg BID PO  Last administered on 5/23/19at 08:54; Admin 


Dose 100 MG;  Start 5/23/19 at 09:00


Gabapentin (Neurontin) 100 mg TID PO  Last administered on 5/23/19at 08:54; 


Admin Dose 100 MG;  Start 5/22/19 at 13:00


Pantoprazole (Protonix Tab) 40 mg DAILY@06 PO ;  Start 5/24/19 at 06:00


Naloxone HCl (Narcan) 0.2 mg Q2M PRN IV .RESP RATE;  Start 5/22/19 at 13:00


Aspirin (Halfprin) 81 mg BID PO  Last administered on 5/23/19at 08:54; Admin 


Dose 81 MG;  Start 5/23/19 at 09:00


Atorvastatin Calcium (Lipitor) 20 mg QHS PO  Last administered on 5/22/19at 


21:41; Admin Dose 20 MG;  Start 5/22/19 at 21:00


Losartan Potassium (Cozaar) 25 mg BID PO  Last administered on 5/23/19at 08:54; 


Admin Dose 25 MG;  Start 5/22/19 at 21:00


Zolpidem Tartrate (Ambien) 5 mg HS  PRN PO INSOMNIA Last administered on 


5/22/19at 21:41; Admin Dose 5 MG;  Start 5/22/19 at 19:00











KENZIE GAMBOA                May 23, 2019 12:43

## 2019-05-23 NOTE — PAC
Date/Time of Note


Date/Time of Note


DATE: 5/23/19 


TIME: 17:28





Post-Anesthesia Notes


Post-Anesthesia Note


Last documented vital signs





Vital Signs


  Date      Temp  Pulse  Resp  B/P (MAP)   Pulse Ox  O2          O2 Flow    FiO2


Time                                                 Delivery    Rate


   5/23/19  98.0     93    18      111/54       100  Room Air


     07:42                           (73)


   5/22/19                                                             2.0


     17:20





Activity:  WNL


Respiratory function:  WNL


Cardiovascular function:  WNL


Mental status:  Baseline


Pain reasonably controlled:  Yes


Hydration appropriate:  Yes


Nausea/Vomiting absent:  No











KALINA BOURGEOIS MD            May 23, 2019 17:28

## 2019-05-24 VITALS — DIASTOLIC BLOOD PRESSURE: 65 MMHG | SYSTOLIC BLOOD PRESSURE: 141 MMHG | HEART RATE: 89 BPM

## 2019-05-24 VITALS — DIASTOLIC BLOOD PRESSURE: 60 MMHG | HEART RATE: 88 BPM | RESPIRATION RATE: 18 BRPM | SYSTOLIC BLOOD PRESSURE: 124 MMHG

## 2019-05-24 VITALS — RESPIRATION RATE: 18 BRPM | DIASTOLIC BLOOD PRESSURE: 71 MMHG | SYSTOLIC BLOOD PRESSURE: 140 MMHG | HEART RATE: 87 BPM

## 2019-05-24 RX ADMIN — HEPARIN SODIUM SCH UNIT: 5000 INJECTION, SOLUTION INTRAVENOUS; SUBCUTANEOUS at 09:00

## 2019-05-24 RX ADMIN — GABAPENTIN SCH MG: 100 CAPSULE ORAL at 08:47

## 2019-05-24 RX ADMIN — PANTOPRAZOLE SODIUM SCH MG: 40 TABLET, DELAYED RELEASE ORAL at 05:40

## 2019-05-24 RX ADMIN — METOPROLOL TARTRATE SCH MG: 25 TABLET, FILM COATED ORAL at 08:38

## 2019-05-24 RX ADMIN — GABAPENTIN SCH MG: 100 CAPSULE ORAL at 13:17

## 2019-05-24 RX ADMIN — PANTOPRAZOLE SODIUM SCH MG: 40 TABLET, DELAYED RELEASE ORAL at 08:37

## 2019-05-24 NOTE — PDOCDIS
Discharge Instructions


CONDITION


                [id number]
Patient Condition:  [id number]
Stable








HOME CARE INSTRUCTIONS:


         [id number]
Diet Instructions:  [id number]
Low Fat /Cholesterol








ACTIVITY:


       [id number]
Activity Restrictions:  [id number]
Rest between Activity


                                          Avoid heavy lifting


                                          Do not Drive


                                          Do not operate Power Tool


                                          Avoid Heavy Housework


       [id number]
Bathing Restrictions:   [id number]
Tub Bath








FOLLOW UP/APPOINTMENTS


Follow-up Plan





Follow-up with the orthopedic surgeon in 2 weeks.


I have changed your blood pressure medications because of your kidneys and you 


had high potassium when you came in.  Please let your primary care doctor now.  


A new prescription has been faxed for you to your pharmacy.


.











KENZIE GAMBOA                May 24, 2019 14:43

## 2019-05-24 NOTE — PN
Date/Time of Note


Date/Time of Note


DATE: 5/24/19 


TIME: 14:36





Assessment/Plan


VTE Prophylaxis


Risk score (from Ns)>0 risk:  8


SCD applied (from Ns):  Yes


Pharmacological prophylaxis:  heparin





Lines/Catheters


IV Catheter Type (from Nrs):  Peripheral IV


Urinary Cath still in place:  No





Assessment/Plan


Hospital Course


S: no new complaints, planned for discharge today by Ortho





Objective: 


Constitutional:  alert, oriented


Head:  atraumatic, normocephalic


Neck:  non-tender, supple


Respiratory:  clear to auscultation


Cardiovascular:  regular rate and rhythm


Gastrointestinal:  S/ NT / ND / +BS


Extremities:  no edema, good radial pulses





assessment and plan: 





73 yo F admitted for elective RTKA for severe R OA being admitted postop for 


whom we are consulted for management of medical conditions: 





1. HTN: losartan chaged to metoprolol for hyperkalemia, good BP control 


2. Dyslipidemia : on statin 


3. CAD s.p CABG : continue plavix only for now, resume asa once cleared by ortho


4. Hypochromic anemia: No indication for transfusion, likely chronic


5. Chronic kidney disease: at baseline


6. Mild hyperkalemia: May be medication induced, patient on losartan, resolved





plan: 


continue current regimen


cleared for discharge from a medical standpoint 





Thanks for the Consult. We will follow with you.


Result Diagram:  


5/24/19 0424                                                                    


           5/24/19 0423





Results 24hrs





Laboratory Tests


       Test
                                5/24/19
04:23  5/24/19
04:24


       Sodium Level                                 140


       Potassium Level                              4.7


       Chloride Level                               105


       Carbon Dioxide Level                          30


       Anion Gap                                      5


       Blood Urea Nitrogen                          32  H


       Creatinine                                 1.04  H


       Est Glomerular Filtrat Rate
mL/min     
            



       Glucose Level                                 99


       Calcium Level                                8.6


       White Blood Count                                          7.9  #


       Red Blood Count                                           4.15  L


       Hemoglobin                                                 8.5  L


       Hematocrit                                                27.8  L


       Mean Corpuscular Volume                                   67.0  L


       Mean Corpuscular Hemoglobin                               20.5  L


       Mean Corpuscular Hemoglobin
Concent  
                   30.6  L



       Red Cell Distribution Width                               14.6  H


       Platelet Count                                              223


       Mean Platelet Volume                                      10.9  H


       Immature Granulocytes %                                   0.300


       Neutrophils %                                              60.4


       Lymphocytes %                                              28.7


       Monocytes %                                                 9.1


       Eosinophils %                                               1.0


       Basophils %                                                 0.5


       Nucleated Red Blood Cells %                                 0.0


       Immature Granulocytes #                                   0.020


       Neutrophils #                                               4.8


       Lymphocytes #                                               2.3


       Monocytes #                                                 0.7


       Eosinophils #                                               0.1


       Basophils #                                                 0.0


       Nucleated Red Blood Cells #                                 0.0








Exam/Review of Systems


Exam


Vitals





Vital Signs


  Date      Temp  Pulse  Resp  B/P (MAP)   Pulse Ox  O2          O2 Flow    FiO2


Time                                                 Delivery    Rate


   5/24/19  98.8     89            141/65        99


     14:00                           (90)


   5/24/19                 18


     08:18


   5/23/19                                           Room Air


     20:26


   5/22/19                                                             2.0


     17:20








Intake and Output





5/23/19 5/23/19 5/24/19





1515:00


23:00


07:00





IntakeIntake Total


200 ml


800 ml





BalanceBalance


200 ml


800 ml














Results


Results 24hrs





Laboratory Tests


       Test
                                5/24/19
04:23  5/24/19
04:24


       Sodium Level                                 140


       Potassium Level                              4.7


       Chloride Level                               105


       Carbon Dioxide Level                          30


       Anion Gap                                      5


       Blood Urea Nitrogen                          32  H


       Creatinine                                 1.04  H


       Est Glomerular Filtrat Rate
mL/min     
            



       Glucose Level                                 99


       Calcium Level                                8.6


       White Blood Count                                          7.9  #


       Red Blood Count                                           4.15  L


       Hemoglobin                                                 8.5  L


       Hematocrit                                                27.8  L


       Mean Corpuscular Volume                                   67.0  L


       Mean Corpuscular Hemoglobin                               20.5  L


       Mean Corpuscular Hemoglobin
Concent  
                   30.6  L



       Red Cell Distribution Width                               14.6  H


       Platelet Count                                              223


       Mean Platelet Volume                                      10.9  H


       Immature Granulocytes %                                   0.300


       Neutrophils %                                              60.4


       Lymphocytes %                                              28.7


       Monocytes %                                                 9.1


       Eosinophils %                                               1.0


       Basophils %                                                 0.5


       Nucleated Red Blood Cells %                                 0.0


       Immature Granulocytes #                                   0.020


       Neutrophils #                                               4.8


       Lymphocytes #                                               2.3


       Monocytes #                                                 0.7


       Eosinophils #                                               0.1


       Basophils #                                                 0.0


       Nucleated Red Blood Cells #                                 0.0








Medications


Medication





Current Medications


IV Flush (NS 3 ml) 3 ml PER PROTOCOL IV ;  Start 5/22/19 at 13:00


Oxycodone HCl (Roxicodone) 5 mg Q4H  PRN PO .PAIN Last administered on 5/24/19at


13:18; Admin Dose 5 MG;  Start 5/22/19 at 13:00


Gabapentin (Neurontin) 100 mg TID PO  Last administered on 5/24/19at 13:17; A


dmin Dose 100 MG;  Start 5/22/19 at 13:00


Pantoprazole (Protonix Tab) 40 mg DAILY@06 PO  Last administered on 5/24/19at 


08:37; Admin Dose 40 MG;  Start 5/24/19 at 06:00


Naloxone HCl (Narcan) 0.2 mg Q2M PRN IV .RESP RATE;  Start 5/22/19 at 13:00


Atorvastatin Calcium (Lipitor) 20 mg QHS PO  Last administered on 5/23/19at 


20:23; Admin Dose 20 MG;  Start 5/22/19 at 21:00


Zolpidem Tartrate (Ambien) 5 mg HS  PRN PO INSOMNIA Last administered on 


5/22/19at 21:41; Admin Dose 5 MG;  Start 5/22/19 at 19:00


Heparin Sodium (Porcine) (Heparin  (5000 Units/1ml)) 5,000 unit BID SC  Last 


administered on 5/23/19at 20:25; Admin Dose 5,000 UNIT;  Start 5/23/19 at 21:00


Metoprolol Tartrate (Lopressor) 25 mg BID PO  Last administered on 5/24/19at 


08:38; Admin Dose 25 MG;  Start 5/23/19 at 21:00


Clopidogrel Bisulfate (plaVIX) 75 mg DAILY PO  Last administered on 5/24/19at 


08:36; Admin Dose 75 MG;  Start 5/24/19 at 09:00


Polyethylene Glycol (Miralax) 17 gm DAILY PO  Last administered on 5/24/19at 


08:46; Admin Dose 17 GM;  Start 5/24/19 at 09:00


Diphenhydramine HCl (Benadryl) 25 mg Q6H  PRN PO ITCHING;  Start 5/23/19 at 


14:30











KENZIE GAMBOA                May 24, 2019 14:41

## 2019-12-30 ENCOUNTER — HOSPITAL ENCOUNTER (EMERGENCY)
Dept: HOSPITAL 54 - ER | Age: 74
Discharge: HOME | End: 2019-12-30
Payer: MEDICARE

## 2019-12-30 VITALS — WEIGHT: 180 LBS | BODY MASS INDEX: 31.89 KG/M2 | HEIGHT: 63 IN

## 2019-12-30 VITALS — DIASTOLIC BLOOD PRESSURE: 75 MMHG | SYSTOLIC BLOOD PRESSURE: 140 MMHG

## 2019-12-30 DIAGNOSIS — Z98.890: ICD-10-CM

## 2019-12-30 DIAGNOSIS — S20.212A: Primary | ICD-10-CM
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
## 2019-12-30 DIAGNOSIS — I25.2: ICD-10-CM
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
## 2019-12-30 PROCEDURE — 71100 X-RAY EXAM RIBS UNI 2 VIEWS: CPT

## 2019-12-30 PROCEDURE — 99283 EMERGENCY DEPT VISIT LOW MDM: CPT

## 2019-12-30 PROCEDURE — 96372 THER/PROPH/DIAG INJ SC/IM: CPT

## 2019-12-30 NOTE — NUR
Pt states "Feel better, pain controlled" For d/c Patient discharged to home in 
stable condition. Written and verbal after care instructions given. Patient 
verbalizes understanding of instruction.

## 2021-03-20 ENCOUNTER — HOSPITAL ENCOUNTER (EMERGENCY)
Dept: HOSPITAL 54 - ER | Age: 76
Discharge: HOME | End: 2021-03-20
Payer: MEDICARE

## 2021-03-20 VITALS — SYSTOLIC BLOOD PRESSURE: 160 MMHG | DIASTOLIC BLOOD PRESSURE: 80 MMHG

## 2021-03-20 VITALS — HEIGHT: 63 IN | WEIGHT: 180 LBS | BODY MASS INDEX: 31.89 KG/M2
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
## 2021-03-20 DIAGNOSIS — I11.9: ICD-10-CM

## 2021-03-20 DIAGNOSIS — Z95.1: ICD-10-CM

## 2021-03-20 DIAGNOSIS — S61.311A: Primary | ICD-10-CM

## 2021-03-20 DIAGNOSIS — Z95.5: ICD-10-CM

## 2021-03-20 DIAGNOSIS — I25.2: ICD-10-CM
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
## 2021-03-20 NOTE — NUR
PT BIBDAUGHTER C/O CUT  LEFT INDEX FINGER WHILE EATING CUCUMBER. PT AAOX4 
BREATHING EVENLY. PT FARSI SPEAKING WITH DAUGHTER TO TRANSLATE. PT SKIN WARM 
AND DRY. MD AT BEDSIDE FOR EVAL. PT ATTTACHED TO MONITOR AND POX.PT GIVEN 
BLANKET AND CALL LIGHT WITHIN REACH. WILL CONTINUE TO MONITOR.

## 2021-03-20 NOTE — NUR
Patient discharged to home in stable condition. Written and verbal after care 
instructions given. Patient verbalizes understanding of instruction. PT 
ambulatory with a steady gait, AMY PICKED HER UP

## 2022-12-07 ENCOUNTER — HOSPITAL ENCOUNTER (EMERGENCY)
Dept: HOSPITAL 54 - ER | Age: 77
LOS: 1 days | Discharge: LEFT BEFORE DISCHARGE | End: 2022-12-08
Payer: MEDICARE

## 2022-12-07 VITALS — HEIGHT: 62 IN | WEIGHT: 175 LBS | BODY MASS INDEX: 32.2 KG/M2

## 2022-12-07 VITALS — SYSTOLIC BLOOD PRESSURE: 145 MMHG | DIASTOLIC BLOOD PRESSURE: 75 MMHG

## 2022-12-07 DIAGNOSIS — Z79.899: ICD-10-CM

## 2022-12-07 DIAGNOSIS — I10: ICD-10-CM

## 2022-12-07 DIAGNOSIS — J01.90: Primary | ICD-10-CM

## 2022-12-07 DIAGNOSIS — I25.2: ICD-10-CM

## 2022-12-07 DIAGNOSIS — E78.00: ICD-10-CM

## 2022-12-07 PROCEDURE — 36415 COLL VENOUS BLD VENIPUNCTURE: CPT

## 2022-12-07 PROCEDURE — 85025 COMPLETE CBC W/AUTO DIFF WBC: CPT

## 2022-12-07 PROCEDURE — 96361 HYDRATE IV INFUSION ADD-ON: CPT

## 2022-12-07 PROCEDURE — 99285 EMERGENCY DEPT VISIT HI MDM: CPT

## 2022-12-07 PROCEDURE — 70450 CT HEAD/BRAIN W/O DYE: CPT

## 2022-12-07 PROCEDURE — 80048 BASIC METABOLIC PNL TOTAL CA: CPT

## 2022-12-07 PROCEDURE — 96374 THER/PROPH/DIAG INJ IV PUSH: CPT

## 2022-12-07 PROCEDURE — 96372 THER/PROPH/DIAG INJ SC/IM: CPT

## 2022-12-07 PROCEDURE — 70481 CT ORBIT/EAR/FOSSA W/DYE: CPT

## 2022-12-08 LAB
BASOPHILS # BLD AUTO: 0 K/UL (ref 0–0.2)
BASOPHILS NFR BLD AUTO: 0.5 % (ref 0–2)
BUN SERPL-MCNC: 26 MG/DL (ref 7–18)
CALCIUM SERPL-MCNC: 8.8 MG/DL (ref 8.5–10.1)
CHLORIDE SERPL-SCNC: 104 MMOL/L (ref 98–107)
CO2 SERPL-SCNC: 28 MMOL/L (ref 21–32)
CREAT SERPL-MCNC: 1.3 MG/DL (ref 0.6–1.3)
EOSINOPHIL NFR BLD AUTO: 1.2 % (ref 0–6)
GLUCOSE SERPL-MCNC: 109 MG/DL (ref 74–106)
HCT VFR BLD AUTO: 34 % (ref 33–45)
HGB BLD-MCNC: 10.5 G/DL (ref 11.5–14.8)
LYMPHOCYTES NFR BLD AUTO: 2 K/UL (ref 0.8–4.8)
LYMPHOCYTES NFR BLD AUTO: 20.7 % (ref 20–44)
MCHC RBC AUTO-ENTMCNC: 31 G/DL (ref 31–36)
MCV RBC AUTO: 65 FL (ref 82–100)
MONOCYTES NFR BLD AUTO: 0.9 K/UL (ref 0.1–1.3)
MONOCYTES NFR BLD AUTO: 9.2 % (ref 2–12)
NEUTROPHILS # BLD AUTO: 6.6 K/UL (ref 1.8–8.9)
NEUTROPHILS NFR BLD AUTO: 68.4 % (ref 43–81)
PLATELET # BLD AUTO: 273 K/UL (ref 150–450)
POTASSIUM SERPL-SCNC: 4.3 MMOL/L (ref 3.5–5.1)
RBC # BLD AUTO: 5.25 MIL/UL (ref 4–5.2)
SODIUM SERPL-SCNC: 140 MMOL/L (ref 136–145)
WBC NRBC COR # BLD AUTO: 9.6 K/UL (ref 4.3–11)

## 2022-12-08 RX ADMIN — SUMATRIPTAN SUCCINATE ONE MG: 6 INJECTION SUBCUTANEOUS at 00:00

## 2022-12-08 RX ADMIN — SODIUM CHLORIDE ONE MG: 9 INJECTION, SOLUTION INTRAVENOUS at 00:00

## 2022-12-08 RX ADMIN — SODIUM CHLORIDE ONE ML: 9 INJECTION, SOLUTION INTRAVENOUS at 00:00

## 2022-12-08 NOTE — NUR
Patient does not wish to proceed with medical care recommended by Dr. Hyde. 
Patient given information related to possible complications, up to and 
including death, which could occur as a result of leaving the hospital at this 
time. Patient verbalizes understanding of risks involved due to leaving against 
medical advice. Patient has signed AMA form.

## 2023-10-04 ENCOUNTER — HOSPITAL ENCOUNTER (INPATIENT)
Dept: HOSPITAL 54 - ER | Age: 78
LOS: 1 days | Discharge: HOME | DRG: 194 | End: 2023-10-05
Attending: NURSE PRACTITIONER | Admitting: NURSE PRACTITIONER
Payer: MEDICARE

## 2023-10-04 VITALS — SYSTOLIC BLOOD PRESSURE: 98 MMHG | OXYGEN SATURATION: 97 % | TEMPERATURE: 97.7 F | DIASTOLIC BLOOD PRESSURE: 64 MMHG

## 2023-10-04 VITALS — OXYGEN SATURATION: 99 % | SYSTOLIC BLOOD PRESSURE: 128 MMHG | TEMPERATURE: 97.9 F | DIASTOLIC BLOOD PRESSURE: 59 MMHG

## 2023-10-04 VITALS — SYSTOLIC BLOOD PRESSURE: 114 MMHG | DIASTOLIC BLOOD PRESSURE: 60 MMHG | TEMPERATURE: 98.2 F | OXYGEN SATURATION: 99 %

## 2023-10-04 VITALS — HEIGHT: 64 IN | BODY MASS INDEX: 31.41 KG/M2 | WEIGHT: 184 LBS

## 2023-10-04 DIAGNOSIS — E44.1: ICD-10-CM

## 2023-10-04 DIAGNOSIS — I25.10: ICD-10-CM

## 2023-10-04 DIAGNOSIS — E66.9: ICD-10-CM

## 2023-10-04 DIAGNOSIS — Z86.16: ICD-10-CM

## 2023-10-04 DIAGNOSIS — Z95.1: ICD-10-CM

## 2023-10-04 DIAGNOSIS — E78.5: ICD-10-CM

## 2023-10-04 DIAGNOSIS — Z20.822: ICD-10-CM

## 2023-10-04 DIAGNOSIS — I10: ICD-10-CM

## 2023-10-04 DIAGNOSIS — J15.9: Primary | ICD-10-CM

## 2023-10-04 DIAGNOSIS — Z79.899: ICD-10-CM

## 2023-10-04 DIAGNOSIS — Z79.02: ICD-10-CM

## 2023-10-04 DIAGNOSIS — I25.2: ICD-10-CM

## 2023-10-04 DIAGNOSIS — Z95.5: ICD-10-CM

## 2023-10-04 DIAGNOSIS — E88.09: ICD-10-CM

## 2023-10-04 DIAGNOSIS — Z79.82: ICD-10-CM

## 2023-10-04 DIAGNOSIS — R74.01: ICD-10-CM

## 2023-10-04 LAB
ALBUMIN SERPL BCP-MCNC: 3.2 G/DL (ref 3.4–5)
ALP SERPL-CCNC: 72 U/L (ref 46–116)
ALT SERPL W P-5'-P-CCNC: 23 U/L (ref 12–78)
ANISOCYTOSIS BLD QL: (no result)
AST SERPL W P-5'-P-CCNC: 22 U/L (ref 15–37)
BASOPHILS # BLD AUTO: 0 K/UL (ref 0–0.2)
BASOPHILS NFR BLD AUTO: 0.6 % (ref 0–2)
BILIRUB DIRECT SERPL-MCNC: 0.3 MG/DL (ref 0–0.2)
BILIRUB SERPL-MCNC: 1.2 MG/DL (ref 0.2–1)
BUN SERPL-MCNC: 14 MG/DL (ref 7–18)
CALCIUM SERPL-MCNC: 8.9 MG/DL (ref 8.5–10.1)
CHLORIDE SERPL-SCNC: 106 MMOL/L (ref 98–107)
CO2 SERPL-SCNC: 21 MMOL/L (ref 21–32)
CREAT SERPL-MCNC: 1.1 MG/DL (ref 0.6–1.3)
EOSINOPHIL # BLD AUTO: 0.1 K/UL (ref 0–0.7)
EOSINOPHIL NFR BLD AUTO: 2.3 % (ref 0–6)
EOSINOPHIL NFR BLD MANUAL: 2 % (ref 0–4)
ERYTHROCYTE [DISTWIDTH] IN BLOOD BY AUTOMATED COUNT: 15.4 % (ref 11.5–15)
GLUCOSE SERPL-MCNC: 125 MG/DL (ref 74–106)
HCT VFR BLD AUTO: 34 % (ref 33–45)
HGB BLD-MCNC: 10.7 G/DL (ref 11.5–14.8)
HYPOCHROMIA BLD QL: (no result)
LYMPHOCYTES NFR BLD AUTO: 1.8 K/UL (ref 0.8–4.8)
LYMPHOCYTES NFR BLD AUTO: 28 % (ref 20–44)
LYMPHOCYTES NFR BLD MANUAL: 25 % (ref 16–48)
MCH RBC QN AUTO: 20 PG (ref 26–33)
MCHC RBC AUTO-ENTMCNC: 32 G/DL (ref 31–36)
MCV RBC AUTO: 64 FL (ref 82–100)
MONOCYTES NFR BLD AUTO: 0.6 K/UL (ref 0.1–1.3)
MONOCYTES NFR BLD AUTO: 8.9 % (ref 2–12)
MONOCYTES NFR BLD MANUAL: 5 % (ref 0–11)
NEUTROPHILS # BLD AUTO: 3.8 K/UL (ref 1.8–8.9)
NEUTROPHILS NFR BLD AUTO: 60.2 % (ref 43–81)
NEUTS SEG NFR BLD MANUAL: 68 % (ref 42–76)
PLATELET # BLD AUTO: 371 K/UL (ref 150–450)
PLATELET BLD QL SMEAR: ADEQUATE
POTASSIUM SERPL-SCNC: 3.6 MMOL/L (ref 3.5–5.1)
PROT SERPL-MCNC: 7.3 G/DL (ref 6.4–8.2)
RBC # BLD AUTO: 5.22 MIL/UL (ref 4–5.2)
SODIUM SERPL-SCNC: 138 MMOL/L (ref 136–145)
WBC NRBC COR # BLD AUTO: 6.3 K/UL (ref 4.3–11)

## 2023-10-04 PROCEDURE — C9803 HOPD COVID-19 SPEC COLLECT: HCPCS

## 2023-10-04 PROCEDURE — G0378 HOSPITAL OBSERVATION PER HR: HCPCS

## 2023-10-04 PROCEDURE — A4223 INFUSION SUPPLIES W/O PUMP: HCPCS

## 2023-10-04 RX ADMIN — ENOXAPARIN SODIUM SCH MG: 30 INJECTION SUBCUTANEOUS at 14:40

## 2023-10-04 RX ADMIN — LOSARTAN POTASSIUM SCH MG: 25 TABLET, FILM COATED ORAL at 17:06

## 2023-10-04 RX ADMIN — Medication SCH ML: at 17:06

## 2023-10-05 VITALS — SYSTOLIC BLOOD PRESSURE: 134 MMHG | DIASTOLIC BLOOD PRESSURE: 61 MMHG | TEMPERATURE: 97.5 F | OXYGEN SATURATION: 98 %

## 2023-10-05 VITALS — DIASTOLIC BLOOD PRESSURE: 91 MMHG | OXYGEN SATURATION: 98 % | TEMPERATURE: 97.7 F | SYSTOLIC BLOOD PRESSURE: 135 MMHG

## 2023-10-05 VITALS — OXYGEN SATURATION: 100 % | DIASTOLIC BLOOD PRESSURE: 69 MMHG | SYSTOLIC BLOOD PRESSURE: 124 MMHG | TEMPERATURE: 97.9 F

## 2023-10-05 VITALS — SYSTOLIC BLOOD PRESSURE: 124 MMHG | DIASTOLIC BLOOD PRESSURE: 69 MMHG

## 2023-10-05 LAB
BASOPHILS # BLD AUTO: 0 K/UL (ref 0–0.2)
BASOPHILS NFR BLD AUTO: 0.9 % (ref 0–2)
BUN SERPL-MCNC: 16 MG/DL (ref 7–18)
CALCIUM SERPL-MCNC: 9.4 MG/DL (ref 8.5–10.1)
CHLORIDE SERPL-SCNC: 106 MMOL/L (ref 98–107)
CHOLEST SERPL-MCNC: 114 MG/DL (ref ?–200)
CO2 SERPL-SCNC: 26 MMOL/L (ref 21–32)
CREAT SERPL-MCNC: 1.1 MG/DL (ref 0.6–1.3)
EOSINOPHIL # BLD AUTO: 0.2 K/UL (ref 0–0.7)
EOSINOPHIL NFR BLD AUTO: 4.5 % (ref 0–6)
ERYTHROCYTE [DISTWIDTH] IN BLOOD BY AUTOMATED COUNT: 15.4 % (ref 11.5–15)
GLUCOSE SERPL-MCNC: 109 MG/DL (ref 74–106)
HCT VFR BLD AUTO: 33 % (ref 33–45)
HDLC SERPL-MCNC: 39 MG/DL (ref 40–60)
HGB BLD-MCNC: 10.5 G/DL (ref 11.5–14.8)
LDLC SERPL DIRECT ASSAY-MCNC: 64 MG/DL (ref 0–99)
LYMPHOCYTES NFR BLD AUTO: 1.7 K/UL (ref 0.8–4.8)
LYMPHOCYTES NFR BLD AUTO: 33.6 % (ref 20–44)
MAGNESIUM SERPL-MCNC: 2.3 MG/DL (ref 1.8–2.4)
MCH RBC QN AUTO: 21 PG (ref 26–33)
MCHC RBC AUTO-ENTMCNC: 32 G/DL (ref 31–36)
MCV RBC AUTO: 64 FL (ref 82–100)
MONOCYTES NFR BLD AUTO: 0.5 K/UL (ref 0.1–1.3)
MONOCYTES NFR BLD AUTO: 9.2 % (ref 2–12)
NEUTROPHILS # BLD AUTO: 2.6 K/UL (ref 1.8–8.9)
NEUTROPHILS NFR BLD AUTO: 51.8 % (ref 43–81)
PHOSPHATE SERPL-MCNC: 3.6 MG/DL (ref 2.5–4.9)
PLATELET # BLD AUTO: 366 K/UL (ref 150–450)
POTASSIUM SERPL-SCNC: 3.9 MMOL/L (ref 3.5–5.1)
RBC # BLD AUTO: 5.11 MIL/UL (ref 4–5.2)
SODIUM SERPL-SCNC: 141 MMOL/L (ref 136–145)
TRIGL SERPL-MCNC: 109 MG/DL (ref 30–150)
WBC NRBC COR # BLD AUTO: 5 K/UL (ref 4.3–11)

## 2023-10-05 RX ADMIN — ENOXAPARIN SODIUM SCH MG: 30 INJECTION SUBCUTANEOUS at 09:36

## 2023-10-05 RX ADMIN — Medication SCH DROP: at 09:07

## 2023-10-05 RX ADMIN — LOSARTAN POTASSIUM SCH MG: 25 TABLET, FILM COATED ORAL at 09:05

## 2024-08-13 ENCOUNTER — HOSPITAL ENCOUNTER (EMERGENCY)
Dept: HOSPITAL 54 - ER | Age: 79
Discharge: HOME | End: 2024-08-13
Payer: MEDICARE

## 2024-08-13 VITALS — BODY MASS INDEX: 31.58 KG/M2 | HEIGHT: 64 IN | WEIGHT: 185 LBS

## 2024-08-13 VITALS — SYSTOLIC BLOOD PRESSURE: 171 MMHG | DIASTOLIC BLOOD PRESSURE: 80 MMHG | TEMPERATURE: 98.1 F | OXYGEN SATURATION: 98 %

## 2024-08-13 DIAGNOSIS — E78.5: ICD-10-CM

## 2024-08-13 DIAGNOSIS — R51.9: Primary | ICD-10-CM

## 2024-08-13 DIAGNOSIS — I25.10: ICD-10-CM

## 2024-08-13 DIAGNOSIS — H92.22: ICD-10-CM

## 2024-08-13 DIAGNOSIS — Z79.82: ICD-10-CM

## 2024-08-13 DIAGNOSIS — Z79.1: ICD-10-CM

## 2024-08-13 DIAGNOSIS — I10: ICD-10-CM

## 2024-08-13 DIAGNOSIS — Z98.890: ICD-10-CM

## 2024-08-13 DIAGNOSIS — Z79.899: ICD-10-CM

## 2024-08-13 DIAGNOSIS — E78.00: ICD-10-CM

## 2024-08-13 RX ADMIN — Medication ONE MG: at 22:54

## 2024-08-13 RX ADMIN — ACETAMINOPHEN ONE MG: 500 TABLET ORAL at 22:54
